# Patient Record
Sex: MALE
[De-identification: names, ages, dates, MRNs, and addresses within clinical notes are randomized per-mention and may not be internally consistent; named-entity substitution may affect disease eponyms.]

---

## 2018-08-22 ENCOUNTER — HOSPITAL ENCOUNTER (EMERGENCY)
Dept: HOSPITAL 14 - H.ER | Age: 43
Discharge: HOME | End: 2018-08-22
Payer: MEDICARE

## 2018-08-22 VITALS
TEMPERATURE: 96.4 F | OXYGEN SATURATION: 99 % | SYSTOLIC BLOOD PRESSURE: 140 MMHG | DIASTOLIC BLOOD PRESSURE: 83 MMHG | RESPIRATION RATE: 20 BRPM

## 2018-08-22 VITALS — BODY MASS INDEX: 32.5 KG/M2

## 2018-08-22 VITALS — HEART RATE: 99 BPM

## 2018-08-22 DIAGNOSIS — E78.00: ICD-10-CM

## 2018-08-22 DIAGNOSIS — E11.9: ICD-10-CM

## 2018-08-22 DIAGNOSIS — L23.9: Primary | ICD-10-CM

## 2018-08-22 DIAGNOSIS — Z79.4: ICD-10-CM

## 2018-08-22 DIAGNOSIS — I10: ICD-10-CM

## 2018-08-22 NOTE — ED PDOC
HPI: General Adult


Time Seen by Provider: 08/22/18 10:22


Chief Complaint (Provider): rash


History Per: Patient


Additional Complaint(s): 


43-year-old male with history of developmental delay presents with mother for 

evaluation of generalized body rash 3 days. Mother has been administering 

Benadryl which has not helped the rash. No associated fever or chills, no 

shortness of breath or throat discomfort. Mother reports no changes in soaps, 

lotions, detergents, perfumes or medications. No recent dietary changes. No 

recent travel. No history of similar symptoms.





PMD:  Dr. Gray





Past Medical History


Reviewed: Historical Data, Nursing Documentation, Vital Signs


Vital Signs: 


 Last Vital Signs











Temp  96.4 F L  08/22/18 10:29


 


Pulse  110 H  08/22/18 10:29


 


Resp  20   08/22/18 10:29


 


BP  140/83   08/22/18 10:29


 


Pulse Ox  99   08/22/18 10:30














- Medical History


PMH: Diabetes, HTN (no meds at present), Hypercholesterolemia





- Surgical History


Surgical History: Cholecystectomy, Endoscopy





- Family History


Family History: States: No Known Family Hx





- Living Arrangements


Living Arrangements: With Family





- Social History


Current smoker - smoking cessation education provided: No


Alcohol: None


Drugs: Denies





- Home Medications


Home Medications: 


 Ambulatory Orders











 Medication  Instructions  Recorded


 


Insulin Glargine,Hum.rec.anlog 45 units SC BID 05/12/15





[Lantus]  


 


Benztropine [Benztropine Mesylate] 0.5 mg PO BID 03/31/16


 


Ciprofloxacin [Cipro] 500 mg PO BID 03/31/16


 


Divalproex [Depakote DR TAB] 125 mg PO BID 03/31/16


 


Risperidone 0.5 mg PO BID 03/31/16


 


Risperidone 2 mg PO BID 03/31/16


 


Zolpidem [Ambien] 10 mg PO HS 03/31/16


 


Acetaminophen with Codeine 1 tab PO Q6 PRN #20 tab 04/05/16





[Tylenol with Codeine No. 3 300  





mg-30 mg]  


 


Ciprofloxacin HCl [Cipro] 500 mg PO Q12 #3 tab 04/05/16


 


Silodosin [Rapaflo] 8 mg PO DAILY #30 cap 04/05/16


 


predniSONE [Prednisone] 20 mg PO BID #12 tab 08/22/18














- Allergies


Allergies/Adverse Reactions: 


 Allergies











Allergy/AdvReac Type Severity Reaction Status Date / Time


 


mnoy Allergy  stephany Uncoded 07/09/15 11:57





   barre  














Review of Systems


ROS Statement: Except As Marked, All Systems Reviewed And Found Negative


Constitutional: Negative for: Fever


Skin: Positive for: Rash





Physical Exam





- Reviewed


Nursing Documentation Reviewed: Yes


Vital Signs Reviewed: Yes





- Physical Exam


Appears: Positive for: Well


Skin: Positive for: Rash (Diffuse urticarial lesions noted to entire torso as 

well as upper and lower extremities)


Eye Exam: Positive for: Normal appearance


Cardiovascular/Chest: Positive for: Regular Rate, Rhythm


Respiratory: Positive for: Normal Breath Sounds


Neurologic/Psych: Positive for: Alert





- ECG


O2 Sat by Pulse Oximetry: 99


Pulse Ox Interpretation: Normal





Medical Decision Making


Medical Decision Making: 


Impression: Allergic dermatitis





Plan: Five-day course of prednisone. Mother advised to continue with Benadryl. 

Advise PMD follow-up in 2-3 days.





Disposition





- Clinical Impression


Clinical Impression: 


 Allergic dermatitis





Counseled Patient/Family Regarding: Diagnosis, Need For Followup, Rx Given





- Disposition


Referrals: 


Kye Gray MD [Family Provider] - 


Disposition: Routine/Home


Disposition Time: 11:02


Condition: STABLE


Additional Instructions: 


Administer prescription meds as directed. Continue with 2 tablets of Benadryl 

every 6 hours. Follow-up with primary doctor in 2-3 days


Prescriptions: 


predniSONE [Prednisone] 20 mg PO BID #12 tab


Instructions:  Hives